# Patient Record
Sex: MALE
[De-identification: names, ages, dates, MRNs, and addresses within clinical notes are randomized per-mention and may not be internally consistent; named-entity substitution may affect disease eponyms.]

---

## 2019-03-27 NOTE — EDM.PDOC
ED HPI GENERAL MEDICAL PROBLEM





- General


Chief Complaint: ENT Problem


Stated Complaint: SWOLLEN FACE


Time Seen by Provider: 03/27/19 21:05


Source of Information: Reports: Patient


History Limitations: Reports: No Limitations





- History of Present Illness


INITIAL COMMENTS - FREE TEXT/NARRATIVE: 





Philipp comes into [facility] ED with a 3 day hx of L upper dental pain, and 

appearance of L hemifacial swelling over the past 24 hrs. There is no sinus 

congestion, sneezing, fever, chills, or sweats. He has not seen a DDS. He has 

tried Ibuprofen for sx relief. 





- Related Data


 Allergies











Allergy/AdvReac Type Severity Reaction Status Date / Time


 


No Known Allergies Allergy   Verified 03/27/19 21:06











Home Meds: 


 Home Meds





Clindamycin HCl 300 mg PO TID #20 capsule 03/27/19 [Rx]











ED ROS ENT





- Review of Systems


Review Of Systems: ROS reveals no pertinent complaints other than HPI.





ED EXAM, ENT





- Physical Exam


Exam: See Below


General Appearance: Alert, WD/WN, Mild Distress


Eye Exam: Bilateral Eye: EOMI, Normal Inspection, PERRL


Ears: Normal External Exam, Normal TMs


Nose: Normal Inspection, Normal Mucousa, Clear Rhinorrhea


Mouth/Throat: Normal Lips, Normal Oropharynx, Gum Swelling (#15 buccal swelling

, tooth exquisitely tender)


Head: Facial Swelling (L hemifacial swelling)


Neck: Normal Inspection, Supple, Non-Tender, Full Range of Motion


Respiratory/Chest: Lungs Clear, Normal Breath Sounds


Cardiovascular: Regular Rate, Rhythm, No Murmur


Back: Normal Inspection


Extremities: Normal Inspection


Neurological: Alert, Oriented, CN II-XII Intact, Normal Cognition, No Motor/

Sensory Deficits


Psychiatric: Normal Affect, Normal Mood


Skin: Warm, Dry, Intact


Lymphatic: No Adenopathy





ED ENT PROCEDURES





- Additional/Other Procedure(s)


Other (Free Text) Procedure(s): 





With patient consent, the gingival swelling at #15 was prepped, anesthestic 

with 2% Lidocaine 1 ml, and I&D with expression of copious exudate. There was 

minimal bleeding. Patient tolerated procedure well.





Course





- Vital Signs


Text/Narrative:: 





Patient tolerated procedure well. 





- Orders/Labs/Meds


Meds: 





Medications














Discontinued Medications














Generic Name Dose Route Start Last Admin





  Trade Name Freq  PRN Reason Stop Dose Admin


 


Clindamycin HCl  300 mg  03/27/19 21:12  





  Cleocin  PO  03/27/19 21:13  





  ONETIME ONE   





     





     





     





     














Departure





- Departure


Time of Disposition: 21:20


Disposition: Home, Self-Care 01


Condition: Fair


Clinical Impression: 


 Dental abscess








- Discharge Information


*PRESCRIPTION DRUG MONITORING PROGRAM REVIEWED*: Not Applicable


*COPY OF PRESCRIPTION DRUG MONITORING REPORT IN PATIENT MALLORY: Not Applicable


Prescriptions: 


Clindamycin HCl 300 mg PO TID #20 capsule





- Problem List & Annotations


(1) Dental abscess


SNOMED Code(s): 946354316


   Code(s): K04.7 - PERIAPICAL ABSCESS WITHOUT SINUS   Status: Acute   

Annotation/Comment:: I dispensed Clindamycin 300 mg tid for a week, suggested 

rinses and NSAIDs for pain sxs.    





- Problem List Review


Problem List Initiated/Reviewed/Updated: Yes





- Assessment/Plan


Plan: 





Follow up with DDS.

## 2020-10-04 ENCOUNTER — HOSPITAL ENCOUNTER (EMERGENCY)
Dept: HOSPITAL 7 - FB.ED | Age: 20
Discharge: HOME | End: 2020-10-04
Payer: SELF-PAY

## 2020-10-04 VITALS — HEART RATE: 74 BPM | SYSTOLIC BLOOD PRESSURE: 122 MMHG | DIASTOLIC BLOOD PRESSURE: 69 MMHG

## 2020-10-04 DIAGNOSIS — L01.00: Primary | ICD-10-CM

## 2020-10-04 PROCEDURE — 99282 EMERGENCY DEPT VISIT SF MDM: CPT

## 2020-10-04 NOTE — EDM.PDOC
ED HPI GENERAL MEDICAL PROBLEM





- General


Stated Complaint: BUMPS,LOOKS LIKE HIVES


Time Seen by Provider: 10/04/20 19:00


Source of Information: Reports: Patient


History Limitations: Reports: No Limitations





- History of Present Illness


INITIAL COMMENTS - FREE TEXT/NARRATIVE: 





Patient presented to the ED because of rash that is spreadi g on his arms and 

now in his legs and back. It's pruritis and  some has pus at the middle. there 

is no associated fever or chills.





- Related Data


                                    Allergies











Allergy/AdvReac Type Severity Reaction Status Date / Time


 


No Known Allergies Allergy   Verified 10/04/20 20:38











Home Meds: 


                                    Home Meds





Clindamycin HCl 300 mg PO TID #20 capsule 03/27/19 [Rx]


cephALEXin [Keflex] 500 mg PO Q8H #30 cap 10/04/20 [Rx]











Social & Family History





- Family History


Family Medical History: Noncontributory





- Caffeine Use


Caffeine Use: Reports: Soda





ED ROS GENERAL





- Review of Systems


Review Of Systems: See Below


Constitutional: Reports: No Symptoms


HEENT: Reports: No Symptoms


Respiratory: Reports: No Symptoms


Cardiovascular: Reports: No Symptoms


Endocrine: Reports: No Symptoms


GI/Abdominal: Reports: No Symptoms


: Reports: No Symptoms


Musculoskeletal: Reports: No Symptoms


Skin: Reports: Rash


Neurological: Reports: No Symptoms


Psychiatric: Reports: No Symptoms





ED EXAM, SKIN/RASH


Exam: See Below


Exam Limited By: No Limitations


General Appearance: Alert, No Apparent Distress


Eye Exam: Bilateral Eye: PERRL


Ears: Normal External Exam, Normal Canal


Nose: Normal Inspection, Normal Mucosa


Throat/Mouth: Normal Inspection, Normal Lips, Normal Teeth


Head: Atraumatic, Normocephalic


Neck: Normal Inspection, Supple, Non-Tender, Full Range of Motion


Respiratory/Chest: No Respiratory Distress, Lungs Clear, Normal Breath Sounds


Cardiovascular: Normal Peripheral Pulses, Regular Rate, Rhythm, No Edema, No 

Gallop


GI/Abdominal: Normal Bowel Sounds, Soft, Non-Tender, No Organomegaly


Rectal (Males) Exam: Normal Exam, Normal Rectal Tone, Prostate Normal


Back Exam: Normal Inspection, Full Range of Motion


Extremities: Normal Inspection, Normal Range of Motion, Non-Tender


Neurological: Alert, Oriented, CN II-XII Intact, Normal Cognition, Normal Gait, 

Normal Reflexes, No Motor/Sensory Deficits


Psychiatric: Normal Affect, Normal Mood


Skin: Warm, Other (urticaria, impetigo)





Course





- Vital Signs


Text/Narrative:: 





keflex 500 mg po x1


Benadryl 50 mg po x1


Last Recorded V/S: 


                                Last Vital Signs











Temp  36.2 C   10/04/20 18:11


 


Pulse  74   10/04/20 18:11


 


Resp  16   10/04/20 18:11


 


BP  122/69   10/04/20 18:11


 


Pulse Ox  100   10/04/20 18:11














- Orders/Labs/Meds


Meds: 


Medications














Discontinued Medications














Generic Name Dose Route Start Last Admin





  Trade Name Estrada  PRN Reason Stop Dose Admin


 


Cephalexin  500 mg  10/04/20 19:18  10/04/20 19:30





  Keflex  PO  10/04/20 19:19  500 mg





  NOW STA   Administration


 


Diphenhydramine HCl  50 mg  10/04/20 19:18  10/04/20 19:30





  Benadryl  PO  10/04/20 19:19  50 mg





  ONETIME ONE   Administration














Departure





- Departure


Time of Disposition: 19:30


Disposition: Home, Self-Care 01


Condition: Good


Clinical Impression: 


 Impetigo








- Discharge Information


Prescriptions: 


cephALEXin [Keflex] 500 mg PO Q8H #30 cap


Instructions:  Impetigo, Adult


Referrals: 


PCP,None [Primary Care Provider] - 


Additional Instructions: 


Please read discharge instructions on Impetigo


Keep your skin moist


Take benadryl 25mg-50 mg every 4-6 hours as needed for itching


Keflex 500 mg 3 times daily for 10 days


[id number] up if symptoms persist





Sepsis Event Note (ED)





- Focused Exam


Vital Signs: 


                                   Vital Signs











  Temp Pulse Resp BP Pulse Ox


 


 10/04/20 18:11  36.2 C  74  16  122/69  100

## 2020-10-23 ENCOUNTER — HOSPITAL ENCOUNTER (EMERGENCY)
Dept: HOSPITAL 7 - FB.ED | Age: 20
LOS: 1 days | Discharge: HOME | End: 2020-10-24
Payer: SELF-PAY
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
## 2020-10-23 DIAGNOSIS — Z23: ICD-10-CM

## 2020-10-23 DIAGNOSIS — S50.811A: ICD-10-CM

## 2020-10-23 DIAGNOSIS — S60.419A: ICD-10-CM
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
## 2020-10-23 DIAGNOSIS — F10.129: ICD-10-CM

## 2020-10-23 DIAGNOSIS — Z91.09: ICD-10-CM

## 2020-10-23 DIAGNOSIS — S01.111A: Primary | ICD-10-CM

## 2020-10-23 NOTE — EDM.PDOCBH
ED HPI GENERAL MEDICAL PROBLEM





- General


Chief Complaint: Drug or Alcohol Abuse


Stated Complaint: HEAD INJURY


Time Seen by Provider: 10/23/20 20:35


Source of Information: Reports: Patient, Family


History Limitations: Reports: Intoxication





- History of Present Illness


INITIAL COMMENTS - FREE TEXT/NARRATIVE: 





Came in with friend 


has been drinking alcohol


fell down


sustained laceration to the left elbow area  ( abrasion about 1 cmin length) 

right upper eyelid with laceration 





Onset: Today


Duration: Getting Worse


Improves with: Reports: None


Worsens with: Reports: None


Associated Symptoms: Reports: Confusion, Headaches, Weakness





- Related Data


                                    Allergies











Allergy/AdvReac Type Severity Reaction Status Date / Time


 


poison ivy Allergy Severe states Uncoded 10/24/20 03:32





   severe  





   reaction  











Home Meds: 


                                    Home Meds





NK [No Known Home Meds]  10/24/20 [History]











Past Medical History





- Past Health History


Medical/Surgical History: Denies Medical/Surgical History





Social & Family History





- Family History


Family Medical History: Noncontributory





- Caffeine Use


Caffeine Use: Reports: Soda





ED ROS GENERAL





- Review of Systems


Review Of Systems: Comprehensive ROS is negative, except as noted in HPI.


Constitutional: Reports: Fever, Chills, Malaise, Weakness





ED EXAM, BEHAVIORAL HEALTH





- Physical Exam


Exam: See Below


Exam Limited By: No Limitations


General Appearance: Alert, WD/WN, No Apparent Distress


Ears: Normal External Exam


Nose: Normal Inspection


Throat/Mouth: Normal Inspection


Head: Other (Laceration over right eyebrow ( old))


Neck: Supple, Non-Tender


Respiratory/Chest: Lungs Clear, Normal Breath Sounds


Cardiovascular: Regular Rate, Rhythm


GI/Abdominal: Soft, Non-Tender


Back Exam: No: CVA Tenderness (R), CVA Tenderness (L), Decreased Range of 

Motion, Vertebral Tenderness


Extremities: Normal Range of Motion, Arm Pain (abrasion on the forearm : 

superficial)


Neurological: Alert, Normal Mood/Affect, CN II-XII Intact


Psychiatric: Alert, Normal Affect, Oriented


Skin Exam: Tattoo(s), Other





COURSE, BEHAVIORAL HEALTH COMP





- Course


Vital Signs: 


                                Last Vital Signs











Temp  36.5 C   10/23/20 20:45


 


Pulse  65   10/24/20 06:55


 


Resp  18   10/24/20 06:55


 


BP  108/46 L  10/24/20 06:55


 


Pulse Ox  100   10/24/20 06:55











Orders, Labs, Meds: 


                               Active Orders 24 hr











 Category Date Time Status


 


 Vaccines to be Administered [RC] PER UNIT ROUTINE Care  10/23/20 20:43 Active


 


 Head wo Cont [CT] Stat Exams  10/23/20 20:37 Taken


 


 Sodium Chloride 0.9% [Normal Saline] 1,000 ml Med  10/23/20 20:45 Active





 IV ASDIRECTED   


 


 Sodium Chloride 0.9% [Normal Saline] 1,000 ml Med  10/23/20 20:45 Active





 IV ASDIRECTED   








                                Medication Orders





Sodium Chloride (Normal Saline)  1,000 mls @ 999 mls/hr IV ASDIRECTED ROD


   Last Admin: 10/23/20 21:20  Dose: 999 mls/hr


   Documented by: JR


Sodium Chloride (Normal Saline)  1,000 mls @ 125 mls/hr IV ASDIRECTED ROD


   Last Admin: 10/24/20 05:51  Dose: 125 mls/hr


   Documented by: JR


   Infusion: 10/24/20 05:51  Dose: 125 mls/hr


   Documented by: JR


   Admin: 10/23/20 22:25  Dose: 125 mls/hr


   Documented by: JR





                                Laboratory Tests











  10/23/20 10/23/20 10/23/20 Range/Units





  20:40 20:40 20:40 


 


WBC  8.4    (4.5-12.0)  X10-3/uL


 


RBC  5.79 H    (4.30-5.75)  x10(6)uL


 


Hgb  17.4    (13.5-17.8)  g/dL


 


Hct  50.5    (30.0-51.3)  %


 


MCV  87.3    (80-96)  fL


 


MCH  30.0    (27.7-33.6)  pg


 


MCHC  34.4    (32.2-35.4)  g/dL


 


RDW  12.6    (11.5-15.5)  %


 


Plt Count  176    (125-369)  X10(3)uL


 


Sodium   143   (135-145)  mmol/L


 


Potassium   3.3 L   (3.5-5.3)  mmol/L


 


Chloride   104   (100-110)  mmol/L


 


Carbon Dioxide   23   (21-32)  mmol/L


 


BUN   5 L   (7-18)  mg/dL


 


Creatinine   0.8   (0.70-1.30)  mg/dL


 


Est Cr Clr Drug Dosing   TNP   


 


Estimated GFR (MDRD)   > 60   (>60)  


 


BUN/Creatinine Ratio   6.3 L   (9-20)  


 


Glucose   111   ()  mg/dL


 


Calcium   8.5 L   (8.6-10.2)  mg/dL


 


Total Bilirubin   0.8   (0.1-1.3)  mg/dL


 


AST   40 H D   (5-25)  IU/L


 


ALT   33  D   (12-36)  U/L


 


Alkaline Phosphatase   121 H   ()  IU/L


 


Total Protein   7.9   (6.0-8.0)  g/dL


 


Albumin   4.1   (3.5-5.2)  g/dL


 


Globulin   3.8   g/dL


 


Albumin/Globulin Ratio   1.1   


 


Urine Opiates Screen     (NEGATIVE)  


 


Ur Oxycodone Screen     (NEGATIVE)  


 


Ur Propoxyphene Screen     (NEGATIVE)  


 


Ur Barbituates Screen     (NEGATIVE)  


 


Ur Tricyclics Screen     (NEGATIVE)  


 


Ur Phencyclidine Scrn     (NEGATIVE)  


 


Ur Amphetamine Screen     (NEGATIVE)  


 


Urine MDMA Screen     (NEGATIVE)  


 


U Benzodiazepines Scrn     (NEGATIVE)  


 


U Cocaine Metab Screen     (NEGATIVE)  


 


U Marijuana (THC) Screen     (NEGATIVE)  


 


Ethyl Alcohol    0.37 H*  (<0.03)  %














  10/23/20 Range/Units





  20:55 


 


WBC   (4.5-12.0)  X10-3/uL


 


RBC   (4.30-5.75)  x10(6)uL


 


Hgb   (13.5-17.8)  g/dL


 


Hct   (30.0-51.3)  %


 


MCV   (80-96)  fL


 


MCH   (27.7-33.6)  pg


 


MCHC   (32.2-35.4)  g/dL


 


RDW   (11.5-15.5)  %


 


Plt Count   (125-369)  X10(3)uL


 


Sodium   (135-145)  mmol/L


 


Potassium   (3.5-5.3)  mmol/L


 


Chloride   (100-110)  mmol/L


 


Carbon Dioxide   (21-32)  mmol/L


 


BUN   (7-18)  mg/dL


 


Creatinine   (0.70-1.30)  mg/dL


 


Est Cr Clr Drug Dosing   


 


Estimated GFR (MDRD)   (>60)  


 


BUN/Creatinine Ratio   (9-20)  


 


Glucose   ()  mg/dL


 


Calcium   (8.6-10.2)  mg/dL


 


Total Bilirubin   (0.1-1.3)  mg/dL


 


AST   (5-25)  IU/L


 


ALT   (12-36)  U/L


 


Alkaline Phosphatase   ()  IU/L


 


Total Protein   (6.0-8.0)  g/dL


 


Albumin   (3.5-5.2)  g/dL


 


Globulin   g/dL


 


Albumin/Globulin Ratio   


 


Urine Opiates Screen  Negative  (NEGATIVE)  


 


Ur Oxycodone Screen  Negative  (NEGATIVE)  


 


Ur Propoxyphene Screen  Negative  (NEGATIVE)  


 


Ur Barbituates Screen  Negative  (NEGATIVE)  


 


Ur Tricyclics Screen  Negative  (NEGATIVE)  


 


Ur Phencyclidine Scrn  Negative  (NEGATIVE)  


 


Ur Amphetamine Screen  Negative  (NEGATIVE)  


 


Urine MDMA Screen  Negative  (NEGATIVE)  


 


U Benzodiazepines Scrn  Negative  (NEGATIVE)  


 


U Cocaine Metab Screen  Negative  (NEGATIVE)  


 


U Marijuana (THC) Screen  Negative  (NEGATIVE)  


 


Ethyl Alcohol   (<0.03)  %








Medications











Generic Name Dose Route Start Last Admin





  Trade Name Freq  PRN Reason Stop Dose Admin


 


Sodium Chloride  1,000 mls @ 999 mls/hr  10/23/20 20:45  10/23/20 21:20





  Normal Saline  IV   999 mls/hr





  ASDIRECTED ROD   Administration


 


Sodium Chloride  1,000 mls @ 125 mls/hr  10/23/20 20:45  10/24/20 05:51





  Normal Saline  IV   125 mls/hr





  ASDIRECTED ROD   Administration














Discontinued Medications














Generic Name Dose Route Start Last Admin





  Trade Name Freq  PRN Reason Stop Dose Admin


 


Diphtheria/Tetanus/Acell Pertussis  0.5 ml  10/23/20 20:43  10/23/20 21:21





  Boostrix  IM  10/23/20 20:44  0.5 ml





  .ONCE ONE   Administration


 


Lorazepam  0.5 mg  10/23/20 20:36  10/23/20 21:05





  Ativan  IVPUSH  10/23/20 20:37  0.5 mg





  ONETIME ONE   Administration











Re-Assessment/Re-Exam: 





pt is currently sleeping , will continue to monitor for any change in his 

condition


Re-Assessment/Re-Exam Date: 10/24/20 (pt slept throughout the night , went to 

urinate several times with unsteady gait , this am no   longer unsteady)





Departure





- Departure


Time of Disposition: 07:15


Disposition: Home, Self-Care 01


Condition: Fair


Clinical Impression: 


 Acute alcohol intoxication, Abrasion of skin of finger of right hand








- Discharge Information


Referrals: 


Nyla Cantor NP [Primary Care Provider] - 


Forms:  ED Department Discharge





Sepsis Event Note (ED)





- Focused Exam


Vital Signs: 


                                   Vital Signs











  Temp Pulse Resp BP Pulse Ox


 


 10/24/20 06:55   65  18  108/46 L  100


 


 10/24/20 04:00   95  18  110/77  96


 


 10/23/20 20:45  36.5 C  114 H  20  150/110 H  96














- My Orders


Last 24 Hours: 


My Active Orders





10/23/20 20:37


Head wo Cont [CT] Stat 





10/23/20 20:43


Vaccines to be Administered [RC] PER UNIT ROUTINE 





10/23/20 20:45


Sodium Chloride 0.9% [Normal Saline] 1,000 ml IV ASDIRECTED 


Sodium Chloride 0.9% [Normal Saline] 1,000 ml IV ASDIRECTED 














- Assessment/Plan


Last 24 Hours: 


My Active Orders





10/23/20 20:37


Head wo Cont [CT] Stat 





10/23/20 20:43


Vaccines to be Administered [RC] PER UNIT ROUTINE 





10/23/20 20:45


Sodium Chloride 0.9% [Normal Saline] 1,000 ml IV ASDIRECTED 


Sodium Chloride 0.9% [Normal Saline] 1,000 ml IV ASDIRECTED

## 2020-10-24 VITALS — HEART RATE: 65 BPM | DIASTOLIC BLOOD PRESSURE: 46 MMHG | SYSTOLIC BLOOD PRESSURE: 108 MMHG

## 2025-05-08 ENCOUNTER — HOSPITAL ENCOUNTER (EMERGENCY)
Dept: HOSPITAL 7 - FB.ED | Age: 25
Discharge: HOME | End: 2025-05-08
Payer: MEDICAID

## 2025-05-08 VITALS — SYSTOLIC BLOOD PRESSURE: 140 MMHG | DIASTOLIC BLOOD PRESSURE: 88 MMHG | HEART RATE: 90 BPM

## 2025-05-08 DIAGNOSIS — F17.210: ICD-10-CM

## 2025-05-08 DIAGNOSIS — Z79.899: ICD-10-CM

## 2025-05-08 DIAGNOSIS — K20.90: Primary | ICD-10-CM

## 2025-05-08 LAB
ALBUMIN/GLOB SERPL: 1.2 {RATIO}
ALP SERPL-CCNC: 94 IU/L (ref 56–112)
ALT SERPL-CCNC: 31 U/L (ref 12–36)
AST SERPL-CCNC: 27 IU/L (ref 5–25)
BASOPHILS NFR BLD AUTO: 0.2 % (ref 0.3–3.8)
BILIRUB SERPL-MCNC: 1.7 MG/DL (ref 0.1–1.3)
BUN SERPL-MCNC: 10 MG/DL (ref 7–18)
BUN/CREAT SERPL: 14.3 (ref 9–20)
CALCIUM SERPL-MCNC: 8.3 MG/DL (ref 8.6–10.2)
CHLORIDE SERPL-SCNC: 98 MMOL/L (ref 100–110)
CO2 SERPL-SCNC: 23 MMOL/L (ref 21–32)
CREAT CL 24H UR+SERPL-VRATE: (no result) ML/MIN
CREAT SERPL-MCNC: 0.7 MG/DL (ref 0.7–1.3)
EOSINOPHIL # BLD AUTO: 0.2 X10-3/UL (ref 0–0.6)
EOSINOPHIL NFR BLD AUTO: 2.5 % (ref 0.1–6.8)
ERYTHROCYTE [DISTWIDTH] IN BLOOD BY AUTOMATED COUNT: 13.1 % (ref 12.4–15)
GLOBULIN SER-MCNC: 3.4 G/DL
GLUCOSE SERPL-MCNC: 114 MG/DL (ref 80–116)
HGB BLD-MCNC: 14.7 G/DL (ref 12.9–17.7)
LYMPHOCYTES # BLD AUTO: 0.9 X10-3/UL (ref 0.5–4.5)
LYMPHOCYTES NFR BLD AUTO: 12.3 % (ref 15.8–45.3)
MCH RBC QN AUTO: 30.7 PG (ref 27–33.3)
MCHC RBC AUTO-ENTMCNC: 35.9 G/DL (ref 28.7–35.3)
MCHC RBC AUTO-ENTMCNC: 85.5 FL (ref 80.8–98.7)
MONOCYTES # BLD AUTO: 0.6 X10-3/UL (ref 0–1.2)
MONOCYTES NFR BLD AUTO: 8.4 % (ref 5.5–15.2)
NEUTROPHILS # BLD AUTO: 5.7 X10-3/UL (ref 1.7–6.9)
NEUTROPHILS NFR BLD AUTO: 76.6 % (ref 40.3–71.8)
PLATELET # BLD AUTO: 147 X10(3)UL (ref 117–477)
PMV BLD AUTO: 8.8 FL (ref 6.7–11)
POTASSIUM SERPL-SCNC: 3.3 MMOL/L (ref 3.5–5.3)
PROT SERPL-MCNC: 7.4 G/DL (ref 6–8)
SODIUM SERPL-SCNC: 132 MMOL/L (ref 135–145)
WBC # BLD AUTO: 7.4 X10-3/UL (ref 3.2–10.1)

## 2025-05-08 RX ADMIN — ALUMINUM HYDROXIDE AND MAGNESIUM HYDROXIDE ONE ML: 200; 200 SUSPENSION ORAL at 07:57

## 2025-05-08 RX ADMIN — IOPAMIDOL SCH ML: 755 INJECTION, SOLUTION INTRAVENOUS at 08:05
